# Patient Record
Sex: FEMALE | Race: WHITE | NOT HISPANIC OR LATINO | Employment: FULL TIME | URBAN - METROPOLITAN AREA
[De-identification: names, ages, dates, MRNs, and addresses within clinical notes are randomized per-mention and may not be internally consistent; named-entity substitution may affect disease eponyms.]

---

## 2023-05-12 ENCOUNTER — APPOINTMENT (OUTPATIENT)
Dept: GENERAL RADIOLOGY | Facility: HOSPITAL | Age: 23
End: 2023-05-12
Payer: OTHER MISCELLANEOUS

## 2023-05-12 ENCOUNTER — HOSPITAL ENCOUNTER (OUTPATIENT)
Facility: HOSPITAL | Age: 23
Discharge: HOME | End: 2023-05-12
Attending: EMERGENCY MEDICINE
Payer: OTHER MISCELLANEOUS

## 2023-05-12 VITALS
OXYGEN SATURATION: 100 % | BODY MASS INDEX: 19.45 KG/M2 | DIASTOLIC BLOOD PRESSURE: 96 MMHG | WEIGHT: 103 LBS | HEART RATE: 111 BPM | HEIGHT: 61 IN | TEMPERATURE: 97.8 F | RESPIRATION RATE: 16 BRPM | SYSTOLIC BLOOD PRESSURE: 146 MMHG

## 2023-05-12 DIAGNOSIS — S40.022A CONTUSION OF LEFT UPPER EXTREMITY, INITIAL ENCOUNTER: Primary | ICD-10-CM

## 2023-05-12 PROCEDURE — 73090 X-RAY EXAM OF FOREARM: CPT

## 2023-05-12 PROCEDURE — G0463 HOSPITAL OUTPT CLINIC VISIT: HCPCS | Performed by: EMERGENCY MEDICINE

## 2023-05-12 PROCEDURE — 73130 X-RAY EXAM OF HAND: CPT

## 2023-05-12 NOTE — Clinical Note
[facility] FSED [place] [address] [place] [address] [place] [address]  Phone: [phone]    Daphney Pike was seen and treated in our emergency department on 5/12/2023.  She may return to work on 05/15/2023.  Limited lifting until pain free and fully recovered.        Thank you for choosing [facility].    George Vazquez MD

## 2023-05-12 NOTE — FSED PROVIDER NOTE
Subjective   History of Present Illness  23-year-old female presents to the emergency room complaining of left forearm pain after a heavy box fell on her patient reports she was moving boxes yesterday when a heavy box that was full of leather fell onto her left arm she immediately put ice on it patient reports she reported back at work today and they advised her to get it evaluated she is right-hand dominant denies any head injury denies any neck pain ambulatory baseline now in ED for further eval        Review of Systems   Constitutional: Negative.    HENT: Negative.    Eyes: Negative.    Respiratory: Negative.    Cardiovascular: Negative.    Gastrointestinal: Negative.    Endocrine: Negative.    Genitourinary: Negative.    Musculoskeletal: Negative.    Skin: Negative.    Allergic/Immunologic: Negative.    Neurological: Negative.    Hematological: Negative.    Psychiatric/Behavioral: Negative.        No past medical history on file.    No Known Allergies    No past surgical history on file.    No family history on file.    Social History     Socioeconomic History   • Marital status: Single           Objective   Physical Exam  Vitals and nursing note reviewed.   Constitutional:       Appearance: Normal appearance.   HENT:      Head: Normocephalic and atraumatic.      Right Ear: Tympanic membrane normal.      Left Ear: Tympanic membrane normal.      Nose: Nose normal.      Mouth/Throat:      Mouth: Mucous membranes are moist.      Pharynx: Oropharynx is clear.   Eyes:      Extraocular Movements: Extraocular movements intact.      Conjunctiva/sclera: Conjunctivae normal.      Pupils: Pupils are equal, round, and reactive to light.   Cardiovascular:      Rate and Rhythm: Normal rate and regular rhythm.      Pulses: Normal pulses.      Heart sounds: Normal heart sounds.   Pulmonary:      Effort: Pulmonary effort is normal.      Breath sounds: Normal breath sounds.   Abdominal:      General: Abdomen is flat.       Palpations: Abdomen is soft.   Musculoskeletal:         General: Tenderness present. Normal range of motion.      Left forearm: Tenderness present.      Cervical back: Normal range of motion and neck supple.      Comments: No obvious deformity neurovascular intact   Skin:     General: Skin is warm and dry.      Capillary Refill: Capillary refill takes less than 2 seconds.   Neurological:      General: No focal deficit present.      Mental Status: She is alert and oriented to person, place, and time.   Psychiatric:         Mood and Affect: Mood normal.         Behavior: Behavior normal.         Procedures           ED Course                                           Medical Decision Making  On exam patient has some tenderness but no obvious deformity pending x-ray to evaluate for fracture likely suffering from contusion    Patient's x-ray appears to be without fracture recommended Ace wrap ice compression elevation NSAIDs use will provide patient a work note    Amount and/or Complexity of Data Reviewed  Radiology: ordered.     Details:   XR Hand 3+ View Left (Final result)  Result time 05/12/23 08:56:27  Final result             Impression:     Impression:   Normal left hand series.       Electronically Signed: Sharda Lozano    5/12/2023 8:56 AM EDT    Workstation ID: [id number]        Narrative:     XR HAND 3+ VW LEFT     Date of Exam: 5/12/2023 8:40 AM EDT     Indication: injury a heavy object landed on her left arm.     Comparison: None available.     Findings:   No acute left hand fracture or joint malalignment is seen. No appreciable osteoarthritic change. No retained radiopaque foreign body.                 XR Forearm 2 View Left (Final result)  Result time 05/12/23 08:57:07  Final result             Impression:     Impression:   Normal 2 views of the left forearm.       Electronically Signed: Sharda Lozano    5/12/2023 8:57 AM EDT    Workstation ID: [id number]        Narrative:     XR FOREARM 2 VW LEFT     Date of  Exam: 5/12/2023 8:40 AM EDT     Indication: direct impact from a heavy tote     Comparison: None available.     Findings:   No left forearm fracture is seen. The wrist and elbow joints maintain satisfactory alignment, without appreciable osteoarthritic change. No joint effusion is seen. No radiopaque foreign body is seen within the soft tissues.                     Final diagnoses:   Contusion of left upper extremity, initial encounter       ED Disposition  ED Disposition     ED Disposition   Discharge    Condition   Stable    Comment   --             PATIENT CONNECTION - [facility] [address]  [phone]             Medication List      No changes were made to your prescriptions during this visit.

## 2023-05-12 NOTE — Clinical Note
[facility] FSED [place] [id number] [place] [address] [place] [address]  Phone: [phone]    Daphney Pike was seen and treated in our emergency department on 5/12/2023.  She may return to work on 05/15/2023.  Limited lifting until pain free and fully recovered.        Thank you for choosing [facility].    George Vazquez MD

## 2024-05-14 ENCOUNTER — HOSPITAL ENCOUNTER (EMERGENCY)
Facility: HOSPITAL | Age: 24
Discharge: HOME | End: 2024-05-14
Attending: EMERGENCY MEDICINE
Payer: MEDICAID

## 2024-05-14 VITALS
RESPIRATION RATE: 18 BRPM | TEMPERATURE: 98.5 F | WEIGHT: 100.09 LBS | OXYGEN SATURATION: 100 % | SYSTOLIC BLOOD PRESSURE: 110 MMHG | HEART RATE: 90 BPM | DIASTOLIC BLOOD PRESSURE: 65 MMHG | HEIGHT: 61 IN | BODY MASS INDEX: 18.9 KG/M2

## 2024-05-14 DIAGNOSIS — R55 SYNCOPE, UNSPECIFIED SYNCOPE TYPE: Primary | ICD-10-CM

## 2024-05-14 LAB
B-HCG UR QL: NEGATIVE
FLUAV SUBTYP SPEC NAA+PROBE: NOT DETECTED
FLUBV RNA ISLT QL NAA+PROBE: NOT DETECTED
GLUCOSE BLDC GLUCOMTR-MCNC: 113 MG/DL (ref 70–105)
SARS-COV-2 RNA RESP QL NAA+PROBE: NOT DETECTED

## 2024-05-14 PROCEDURE — 81025 URINE PREGNANCY TEST: CPT | Performed by: EMERGENCY MEDICINE

## 2024-05-14 PROCEDURE — 82948 REAGENT STRIP/BLOOD GLUCOSE: CPT | Performed by: EMERGENCY MEDICINE

## 2024-05-14 PROCEDURE — 93005 ELECTROCARDIOGRAM TRACING: CPT | Performed by: EMERGENCY MEDICINE

## 2024-05-14 PROCEDURE — 93005 ELECTROCARDIOGRAM TRACING: CPT

## 2024-05-14 PROCEDURE — 99283 EMERGENCY DEPT VISIT LOW MDM: CPT

## 2024-05-14 PROCEDURE — 87636 SARSCOV2 & INF A&B AMP PRB: CPT | Performed by: EMERGENCY MEDICINE

## 2024-05-14 NOTE — Clinical Note
[facility] EMERGENCY DEPARTMENT  [address] [facility] [place] [address]  Phone: [phone]    Daphney Pike was seen and treated in our emergency department on 5/14/2024.  She may return to work on 05/17/2024.         Thank you for choosing [facility].    Isaiah Hansen MD

## 2024-05-15 LAB
QT INTERVAL: 363 MS
QTC INTERVAL: 458 MS

## 2024-05-15 NOTE — ED PROVIDER NOTES
Subjective   History of Present Illness  Patient is a 24-year-old female complaining of having a syncopal episode tonight.  She has had recent cough and congestion is currently on antibiotics for her over operatory symptoms.  She denies complaints at this time.      Review of Systems    No past medical history on file.    No Known Allergies    No past surgical history on file.    No family history on file.    Social History     Socioeconomic History    Marital status: Single           Objective   Physical Exam  Neurologic exam is nonfocal.  Neck has no adenopathy JVD or bruits.  Lungs clear.  Heart has regular rate and rhythm.  Chest nontender.  Abdomen soft.  Extremity exam unremarkable.  Procedures     EKG interpretation shows normal sinus rhythm at a rate of 95 with no acute ST change      ED Course      Results for orders placed or performed during the hospital encounter of 05/14/24   COVID-19 and FLU A/B PCR, 1 HR TAT - Swab, Nasopharynx    Specimen: Nasopharynx; Swab   Result Value Ref Range    COVID19 Not Detected Not Detected - Ref. Range    Influenza A PCR Not Detected Not Detected    Influenza B PCR Not Detected Not Detected   Pregnancy, Urine - Urine, Clean Catch    Specimen: Urine, Clean Catch   Result Value Ref Range    HCG, Urine QL Negative Negative   POC Glucose Once    Specimen: Blood   Result Value Ref Range    Glucose 113 (H) 70 - 105 mg/dL   ECG 12 Lead Syncope   Result Value Ref Range    QT Interval 363 ms    QTC Interval 458 ms     No radiology results for the last day                                         Medical Decision Making  Patient's Accu-Chek was normal.  Patient was not orthostatic on testing.  She is not pregnant.  She is negative for COVID and influenza.  Patient be discharged.  She will drink plenty of fluids and rest and follow with her MD for recheck as needed.    Amount and/or Complexity of Data Reviewed  Labs: ordered. Decision-making details documented in ED  Course.  ECG/medicine tests: ordered and independent interpretation performed.        Final diagnoses:   Syncope, unspecified syncope type       ED Disposition  ED Disposition       ED Disposition   Discharge    Condition   Stable    Comment   --               No follow-up provider specified.       Medication List      No changes were made to your prescriptions during this visit.            Isaiah Hansen MD  05/14/24 [id number]

## 2024-11-29 ENCOUNTER — HOSPITAL ENCOUNTER (OUTPATIENT)
Facility: HOSPITAL | Age: 24
Discharge: HOME | End: 2024-11-29
Attending: EMERGENCY MEDICINE | Admitting: EMERGENCY MEDICINE
Payer: MEDICAID

## 2024-11-29 VITALS
HEIGHT: 61 IN | DIASTOLIC BLOOD PRESSURE: 100 MMHG | BODY MASS INDEX: 18.98 KG/M2 | TEMPERATURE: 98.4 F | OXYGEN SATURATION: 99 % | WEIGHT: 100.53 LBS | RESPIRATION RATE: 14 BRPM | SYSTOLIC BLOOD PRESSURE: 141 MMHG | HEART RATE: 98 BPM

## 2024-11-29 DIAGNOSIS — K04.7 DENTAL INFECTION: ICD-10-CM

## 2024-11-29 DIAGNOSIS — K02.9 DENTAL CARIES: Primary | ICD-10-CM

## 2024-11-29 PROCEDURE — G0463 HOSPITAL OUTPT CLINIC VISIT: HCPCS | Performed by: EMERGENCY MEDICINE

## 2024-11-29 RX ORDER — AMOXICILLIN AND CLAVULANATE POTASSIUM 500; 125 MG/1; 1/1
1 TABLET, FILM COATED ORAL 2 TIMES DAILY
Qty: 10 TABLET | Refills: 0 | Status: SHIPPED | OUTPATIENT
Start: 2024-11-29 | End: 2024-12-04

## 2024-11-29 RX ORDER — NAPROXEN 500 MG/1
500 TABLET ORAL 2 TIMES DAILY PRN
Qty: 6 TABLET | Refills: 0 | Status: SHIPPED | OUTPATIENT
Start: 2024-11-29

## 2024-11-29 RX ORDER — OXYCODONE HYDROCHLORIDE 5 MG/1
5 TABLET ORAL ONCE
Status: COMPLETED | OUTPATIENT
Start: 2024-11-29 | End: 2024-11-29

## 2024-11-29 RX ADMIN — OXYCODONE HYDROCHLORIDE 5 MG: 5 TABLET ORAL at 19:50

## 2024-11-30 NOTE — FSED PROVIDER NOTE
Subjective   History of Present Illness  Patient with a history of dental workup, complaining of right lower jaw pain for the past few days.    On exam, patient has multiple dental caries, dental fractures, dental fillings, but the right lower jaw with a molar tooth with significant dental caries and redness of the gums around the tooth.  No cervical lymphadenopathy.  No facial swelling.    History provided by:  Patient      Review of Systems   Constitutional: Negative.    HENT:  Positive for dental problem. Negative for drooling, ear pain, facial swelling, hearing loss, mouth sores, sinus pressure, trouble swallowing and voice change.    Respiratory: Negative.     Skin: Negative.        Past Medical History:   Diagnosis Date    ADHD     Endometriosis        No Known Allergies    Past Surgical History:   Procedure Laterality Date    CHOLECYSTECTOMY         History reviewed. No pertinent family history.    Social History     Socioeconomic History    Marital status: Single   Vaping Use    Vaping status: Every Day           Objective   Physical Exam  Constitutional:       General: She is not in acute distress.     Appearance: Normal appearance. She is not toxic-appearing.   HENT:      Head: Normocephalic and atraumatic.      Mouth/Throat:      Mouth: Mucous membranes are moist.      Comments: Multiple dental fractures and dental caries  Eyes:      Extraocular Movements: Extraocular movements intact.      Conjunctiva/sclera: Conjunctivae normal.      Pupils: Pupils are equal, round, and reactive to light.   Musculoskeletal:      Cervical back: Normal range of motion. No rigidity.   Lymphadenopathy:      Cervical: No cervical adenopathy.   Neurological:      Mental Status: She is alert.         Procedures           ED Course                                           Medical Decision Making  Patient will follow-up with a dentist for likely dental extraction.    Problems Addressed:  Dental caries: complicated acute illness  or injury  Dental infection: complicated acute illness or injury    Risk  Prescription drug management.        Final diagnoses:   Dental caries   Dental infection       ED Disposition  ED Disposition       ED Disposition   Discharge    Condition   Stable    Comment   --               Amador Plaza MD  [address] [place] [address]  [phone]          [facility]  [address] [place] [address]  [phone]             Medication List        New Prescriptions      Augmentin 500-125 MG per tablet  Generic drug: amoxicillin-clavulanate  Take 1 tablet by mouth 2 (Two) Times a Day for 10 doses.     naproxen 500 MG EC tablet  Commonly known as: EC NAPROSYN  Take 1 tablet by mouth 2 (Two) Times a Day As Needed for Mild Pain for up to 6 doses.               Where to Get Your Medications        These medications were sent to [id number] - [place], [place] - [address] - [phone]  - [phone] FX  [address] [place] [address]      Phone: [phone]   Augmentin 500-125 MG per tablet  naproxen 500 MG EC tablet